# Patient Record
Sex: FEMALE | Race: BLACK OR AFRICAN AMERICAN | NOT HISPANIC OR LATINO | Employment: FULL TIME | URBAN - METROPOLITAN AREA
[De-identification: names, ages, dates, MRNs, and addresses within clinical notes are randomized per-mention and may not be internally consistent; named-entity substitution may affect disease eponyms.]

---

## 2022-11-25 ENCOUNTER — OFFICE VISIT (OUTPATIENT)
Dept: URGENT CARE | Facility: CLINIC | Age: 42
End: 2022-11-25

## 2022-11-25 VITALS
BODY MASS INDEX: 25.07 KG/M2 | RESPIRATION RATE: 20 BRPM | HEIGHT: 66 IN | HEART RATE: 80 BPM | OXYGEN SATURATION: 99 % | TEMPERATURE: 98.1 F | WEIGHT: 156 LBS

## 2022-11-25 DIAGNOSIS — J06.9 UPPER RESPIRATORY TRACT INFECTION, UNSPECIFIED TYPE: Primary | ICD-10-CM

## 2022-11-25 NOTE — PROGRESS NOTES
[facility]        NAME: Tiki Garcia is a 43 y o  female  : 1980    MRN: [id number]  DATE: 2022  TIME: 4:09 PM    Assessment and Plan   Upper respiratory tract infection, unspecified type [J06 9]  1  Upper respiratory tract infection, unspecified type              Patient Instructions       Follow up with PCP in 3-5 days  Proceed to  ER if symptoms worsen  Chief Complaint     Chief Complaint   Patient presents with   • Cold Like Symptoms     Pt reports of worsening URI s/s started approx 1 day ago  History of Present Illness       Head cold symptoms times 24 hours  Review of Systems   Review of Systems   Constitutional: Negative for fever  HENT: Positive for congestion  Respiratory: Positive for choking  Negative for shortness of breath  Current Medications     No current outpatient medications on file  Current Allergies     Allergies as of 2022 - never reviewed   Allergen Reaction Noted   • Pollen extract Other (See Comments) 2022            The following portions of the patient's history were reviewed and updated as appropriate: allergies, current medications, past family history, past medical history, past social history, past surgical history and problem list      History reviewed  No pertinent past medical history  Past Surgical History:   Procedure Laterality Date   • TUBAL LIGATION         History reviewed  No pertinent family history  Medications have been verified  Objective   Pulse 80   Temp 98 1 °F (36 7 °C)   Resp 20   Ht 5' 6" (1 676 m)   Wt 70 8 kg (156 lb)   SpO2 99%   BMI 25 18 kg/m²   No LMP recorded  Physical Exam     Physical Exam  Cardiovascular:      Heart sounds: Normal heart sounds  Pulmonary:      Breath sounds: Normal breath sounds         Rapid COVID negative